# Patient Record
Sex: FEMALE | Race: WHITE | NOT HISPANIC OR LATINO | Employment: OTHER | URBAN - METROPOLITAN AREA
[De-identification: names, ages, dates, MRNs, and addresses within clinical notes are randomized per-mention and may not be internally consistent; named-entity substitution may affect disease eponyms.]

---

## 2017-11-13 ENCOUNTER — TELEPHONE (OUTPATIENT)
Dept: HEMATOLOGY/ONCOLOGY | Facility: CLINIC | Age: 72
End: 2017-11-13

## 2017-11-13 NOTE — TELEPHONE ENCOUNTER
----- Message from Joyce Shen sent at 11/13/2017  9:45 AM CST -----  Contact: Pt  Pt calling to schedule her yearly follow up with     Pt call back number [phone]

## 2017-11-13 NOTE — TELEPHONE ENCOUNTER
Returned call, spoke with patient and assisted with scheduling her f/u with labs and mammogram. Apt slips mailed out

## 2018-01-03 ENCOUNTER — HOSPITAL ENCOUNTER (OUTPATIENT)
Dept: RADIOLOGY | Facility: HOSPITAL | Age: 73
Discharge: HOME | End: 2018-01-03
Attending: INTERNAL MEDICINE
Payer: MEDICARE

## 2018-01-03 ENCOUNTER — OFFICE VISIT (OUTPATIENT)
Dept: HEMATOLOGY/ONCOLOGY | Facility: CLINIC | Age: 73
End: 2018-01-03
Payer: MEDICARE

## 2018-01-03 VITALS
RESPIRATION RATE: 20 BRPM | DIASTOLIC BLOOD PRESSURE: 75 MMHG | HEART RATE: 68 BPM | SYSTOLIC BLOOD PRESSURE: 120 MMHG | WEIGHT: 167.13 LBS | BODY MASS INDEX: 26.97 KG/M2

## 2018-01-03 VITALS — WEIGHT: 161 LBS | BODY MASS INDEX: 25.88 KG/M2 | HEIGHT: 66 IN

## 2018-01-03 DIAGNOSIS — E78.5 HYPERLIPIDEMIA, UNSPECIFIED HYPERLIPIDEMIA TYPE: ICD-10-CM

## 2018-01-03 DIAGNOSIS — Z12.31 ENCOUNTER FOR SCREENING MAMMOGRAM FOR BREAST CANCER: ICD-10-CM

## 2018-01-03 DIAGNOSIS — Z90.81 POST-SPLENECTOMY: ICD-10-CM

## 2018-01-03 DIAGNOSIS — D58.0 HEREDITARY SPHEROCYTOSIS: Primary | ICD-10-CM

## 2018-01-03 PROCEDURE — 77067 SCR MAMMO BI INCL CAD: CPT | Mod: TC

## 2018-01-03 PROCEDURE — 77067 SCR MAMMO BI INCL CAD: CPT | Mod: 26,,, | Performed by: RADIOLOGY

## 2018-01-03 PROCEDURE — 99213 OFFICE O/P EST LOW 20 MIN: CPT | Mod: PBBFAC | Performed by: INTERNAL MEDICINE

## 2018-01-03 PROCEDURE — 99999 PR PBB SHADOW E&M-EST. PATIENT-LVL III: CPT | Mod: PBBFAC,,, | Performed by: INTERNAL MEDICINE

## 2018-01-03 PROCEDURE — 99214 OFFICE O/P EST MOD 30 MIN: CPT | Mod: S$PBB,,, | Performed by: INTERNAL MEDICINE

## 2018-01-03 PROCEDURE — 77063 BREAST TOMOSYNTHESIS BI: CPT | Mod: 26,,, | Performed by: RADIOLOGY

## 2018-01-03 NOTE — PROGRESS NOTES
Mrs. Walters is a 72-year-old woman who had a splenectomy in 1982 for hereditary   spherocytosis.  At that time, she had compensated hemolysis and cholelithiasis   and she had previously had problems with anemia.  Since an open cholecystectomy   was to be performed, an elective splenectomy was carried out the same time.  A   liver biopsy was performed because of mild elevation of the transaminases and   this was normal.    She has been followed since that time without any evidence of recurrent anemia   and no aplastic crises.  She had both pneumococcal 23 and meningococcal vaccines   in December 2014 and pneumococcal 13 vaccine in December 2015.    Since I last saw her, she has generally been well.  She has some stiffness of   her knees and she has considered some therapy using platelet-rich plasma.  She   has also spoken with an orthopedic surgeon.  I have encouraged her not to have   surgery unless she is having symptoms or significant impairment in function.  She has hyperlipidemia for which she takes Crestor every other day.  She is   taking calcium plus vitamin D for osteopenia.  She rarely takes a proton pump   inhibitor for symptoms of gastroesophageal reflux.    She has had no recent infection problems.  She is very aware that she needs   immediate medical attention if she has high fever or other signs of infection.    She continues to spend much of the summer in [place] and the winter in   [place].    She tells me that some of her nephews have hereditary spherocytosis and are   being seen at [facility] in [place], [place].  She understands that   one of them may soon have a partial splenectomy.    ADDITIONAL PAST HISTORY, SYSTEM REVIEW, SOCIAL HISTORY AND FAMILY HISTORY:  Have   been reviewed and updated in the electronic record.  PHYSICAL EXAMINATION:  GENERAL APPEARANCE:  Well-developed, well-nourished woman, in no distress.  EYES:  No jaundice or pallor.  EARS:  Clear canals and  membranes.  SINUSES:  No tenderness.  MOUTH AND THROAT:  No mucosal lesions.  Good dentition.  NECK:  No masses or bruits.  No thyroid abnormalities.  LYMPH NODES:  No enlarged cervical, axillary or inguinal nodes.  BREASTS:  No abnormalities.  CHEST AND LUNGS:  Normal respiratory effort.  Clear to auscultation and   percussion.  HEART:  Regular rate and rhythm without murmur or gallop.  ABDOMEN:  Soft, without masses or tenderness.  No hepatomegaly.  PERIPHERAL VASCULATURE:  Good pulses in the feet and ankles.  EXTREMITIES:  No edema or cyanosis.  SKIN:  No suspicious lesions in the areas examined.  There is some dryness of   the skin of her back and arms.  NEUROLOGIC:  Mental status is good.  She is fully oriented.  Motor function is   Normal.    LABORATORY STUDIES:  Blood counts today include hemoglobin 13.3, WBC 7710 and   platelets 294,000.  Comprehensive metabolic profile is normal.  Cholesterol is   165 with HDL 61 and LDL 87.  The mammogram has not yet been reported.    IMPRESSION:  1.  Hereditary spherocytosis.  2.  Prior splenectomy.  3.  Hyperlipidemia, controlled.  4.  Osteopenia of the femoral neck.    RECOMMENDATIONS:  1.  Continue Crestor and calcium with vitamin D.  2.  We again talked about the importance of immediate attention for signs of   infection.  3.  We also reviewed vaccinations.  I have generally repeated pneumococcal and   an meningococcal vaccines approximately five years after splenectomies.  The   issue of whether pneumococcal 13 should be repeated every five years is not   clear at this time.  4.  Return visit in one year with CBC, CMP, lipid profile and screening   mammogram.  Mrs. Walters had a number of questions about anemia, degenerative arthritis and   splenectomy issues.  Most of her 30-minute visit today was devoted to counseling   her about these matters.      PURA/CHET  dd: 01/03/2018 11:47:33 (CST)  td: 01/04/2018 05:31:52 (CST)  Doc ID   #[id number]  Job ID #[id number]    CC:

## 2018-03-27 ENCOUNTER — PATIENT MESSAGE (OUTPATIENT)
Dept: HEMATOLOGY/ONCOLOGY | Facility: CLINIC | Age: 73
End: 2018-03-27

## 2018-11-14 DIAGNOSIS — Z00.00 GENERAL MEDICAL EXAM: Primary | ICD-10-CM

## 2018-11-14 DIAGNOSIS — Z12.31 ENCOUNTER FOR SCREENING MAMMOGRAM FOR BREAST CANCER: ICD-10-CM

## 2018-11-16 ENCOUNTER — TELEPHONE (OUTPATIENT)
Dept: HEMATOLOGY/ONCOLOGY | Facility: CLINIC | Age: 73
End: 2018-11-16

## 2018-11-16 NOTE — TELEPHONE ENCOUNTER
Message fwd to .         ----- Message from Samir Clark sent at 11/16/2018  9:14 AM CST -----  Contact: pt  Needs Advice    Reason for call: Pt would like to schedule annual appt        Communication Preference: [phone]     Additional Information:

## 2018-11-16 NOTE — TELEPHONE ENCOUNTER
Returned patients call to schedule appointments. We scheduled them for 1/7/2019. Mailed appt slips.      ----- Message from Felicity Nj sent at 11/16/2018  9:59 AM CST -----  Contact: pt      ----- Message -----  From: Samir Clark  Sent: 11/16/2018   9:14 AM  To: Isai POLLOCK Jr Staff    Needs Advice    Reason for call: Pt would like to schedule annual appt        Communication Preference: [phone]     Additional Information:

## 2019-01-07 ENCOUNTER — OFFICE VISIT (OUTPATIENT)
Dept: HEMATOLOGY/ONCOLOGY | Facility: CLINIC | Age: 74
End: 2019-01-07
Payer: MEDICARE

## 2019-01-07 ENCOUNTER — HOSPITAL ENCOUNTER (OUTPATIENT)
Dept: RADIOLOGY | Facility: HOSPITAL | Age: 74
Discharge: HOME | End: 2019-01-07
Attending: INTERNAL MEDICINE
Payer: MEDICARE

## 2019-01-07 VITALS
HEIGHT: 66 IN | HEART RATE: 68 BPM | BODY MASS INDEX: 25.79 KG/M2 | DIASTOLIC BLOOD PRESSURE: 72 MMHG | WEIGHT: 160.5 LBS | SYSTOLIC BLOOD PRESSURE: 116 MMHG

## 2019-01-07 DIAGNOSIS — Z90.81 POST-SPLENECTOMY: ICD-10-CM

## 2019-01-07 DIAGNOSIS — Z12.31 ENCOUNTER FOR SCREENING MAMMOGRAM FOR BREAST CANCER: ICD-10-CM

## 2019-01-07 DIAGNOSIS — M85.851 OSTEOPENIA OF NECKS OF BOTH FEMURS: ICD-10-CM

## 2019-01-07 DIAGNOSIS — M85.852 OSTEOPENIA OF NECKS OF BOTH FEMURS: ICD-10-CM

## 2019-01-07 DIAGNOSIS — E78.5 HYPERLIPIDEMIA: ICD-10-CM

## 2019-01-07 DIAGNOSIS — D58.0 HEREDITARY SPHEROCYTOSIS: Primary | ICD-10-CM

## 2019-01-07 PROCEDURE — 99999 PR PBB SHADOW E&M-EST. PATIENT-LVL III: ICD-10-PCS | Mod: PBBFAC,,, | Performed by: INTERNAL MEDICINE

## 2019-01-07 PROCEDURE — 77063 BREAST TOMOSYNTHESIS BI: CPT | Mod: 26,,, | Performed by: RADIOLOGY

## 2019-01-07 PROCEDURE — 77063 MAMMO DIGITAL SCREENING BILAT WITH TOMOSYNTHESIS_CAD: ICD-10-PCS | Mod: 26,,, | Performed by: RADIOLOGY

## 2019-01-07 PROCEDURE — 77067 MAMMO DIGITAL SCREENING BILAT WITH TOMOSYNTHESIS_CAD: ICD-10-PCS | Mod: 26,,, | Performed by: RADIOLOGY

## 2019-01-07 PROCEDURE — 99214 OFFICE O/P EST MOD 30 MIN: CPT | Mod: S$PBB,,, | Performed by: INTERNAL MEDICINE

## 2019-01-07 PROCEDURE — 99999 PR PBB SHADOW E&M-EST. PATIENT-LVL III: CPT | Mod: PBBFAC,,, | Performed by: INTERNAL MEDICINE

## 2019-01-07 PROCEDURE — 77067 SCR MAMMO BI INCL CAD: CPT | Mod: TC

## 2019-01-07 PROCEDURE — 77067 SCR MAMMO BI INCL CAD: CPT | Mod: 26,,, | Performed by: RADIOLOGY

## 2019-01-07 PROCEDURE — 99214 PR OFFICE/OUTPT VISIT, EST, LEVL IV, 30-39 MIN: ICD-10-PCS | Mod: S$PBB,,, | Performed by: INTERNAL MEDICINE

## 2019-01-07 PROCEDURE — 99213 OFFICE O/P EST LOW 20 MIN: CPT | Mod: PBBFAC | Performed by: INTERNAL MEDICINE

## 2019-01-07 RX ORDER — ROSUVASTATIN CALCIUM 5 MG/1
TABLET, COATED ORAL
Qty: 30 TABLET | Refills: 3
Start: 2019-01-07

## 2019-01-07 NOTE — PROGRESS NOTES
"Mrs. Walters is a 73-year-old woman who in 1982 had a splenectomy for hereditary   spherocytosis.  She had previously had problems with anemia.  At the time of the   splenectomy, she had compensated hemolysis and cholelithiasis.  This was long   before the era of laparoscopic surgery and since she was to have an open   cholecystectomy, an elective splenectomy was performed at the same time.  A   liver biopsy was also obtained because of mild elevation of the transaminases   and that biopsy was normal.    She has been followed since that time without evidence of recurrent anemia and   with no aplastic crises.  She has had a pneumococcal 13 and pneumococcal 23   vaccines along with meningococcal vaccines.  She will be due for a pneumococcal   23 vaccination and a repeat meningococcal vaccination in late 2019 or early   2020.    Since her last visit one year ago, she has felt well.  She has had no   infections.  She takes 5 mg of Crestor every other day for hyperlipidemia and   she takes calcium plus vitamin D for osteopenia.  She occasionally takes a   proton pump inhibitor for symptoms of gastroesophageal reflux.  She has   degenerative arthritis in her knees, but has very little pain in the knees.  She   has recently had "stem cell therapy" into her knee joints.  She seems troubled   by a "clicking sensation" of her knees.    She is very aware that she should always report her splenectomy to anyone who   sees her for fever or possible infection.    As I have noted in the past, she has nephews who have hereditary spherocytosis.    She continues to spend much of the summer in [place] and the winter in   [place].    ADDITIONAL PAST HISTORY, SYSTEM REVIEW, SOCIAL HISTORY AND FAMILY HISTORY:  Have   been reviewed and updated in the electronic record.    PHYSICAL EXAMINATION:  GENERAL APPEARANCE:  Well-developed, well-nourished woman, in no distress.  EYES:  No jaundice or pallor.  EARS:  Clear canals and " membranes.  NOSE:  Clear nares.  SINUSES:  No tenderness.  MOUTH AND THROAT:  Good dentition.  No mucosal lesions.  NECK:  No thyroid abnormalities.  No masses or bruits.  LYMPH NODES:  No enlarged cervical, axillary or inguinal nodes.  CHEST AND LUNGS:  Normal respiratory effort.  Clear to auscultation and   percussion.  BREASTS:  No abnormalities.  HEART:  Regular rate and rhythm without murmur or gallop.  ABDOMEN:  Soft without masses or tenderness.  No hepatomegaly.  EXTREMITIES:  No edema or cyanosis.  PERIPHERAL VASCULATURE:  Adequate pulses in the feet and ankles.  SKIN:  No suspicious lesions in the areas examined.  NEUROLOGIC:  Motor function is normal.  Mental status is good.  She is fully   oriented.    LABORATORY STUDIES:  Blood counts today include hemoglobin 14.2, WBC 7750 and   platelets 329,000.  Comprehensive metabolic profile is entirely normal.  A lipid   profile includes cholesterol 151, HDL 58 and LDL 82.  Triglyceride is 54.  The   mammogram report is pending.    IMPRESSION:  1.  Hereditary spherocytosis.  2.  Prior splenectomy.  3.  Osteopenia of the femoral neck.  4.  Hyperlipidemia, controlled.    RECOMMENDATIONS:  1.  Continue Crestor and calcium with vitamin D.  2.  I wrote down the vaccinations that she should receive in about a year.  3.  She understands that I will be retiring soon. If she may wishes to return to   [facility], I have given her the names of several of my colleagues whom she could   see if she wants to continue visits with a hematologist.  I think that she could   adequately be followed by her internist or any physician who is aware of   potential problems associated with prior splenectomy.    Mrs. Walters had a number of questions about her medical status and most of her   30-minute visit today was devoted to counseling her about those.      PURA/HN  dd: 01/07/2019 13:56:46 (CST)  td: 01/08/2019 02:54:36 (CST)  Doc ID   #[id number]  Job ID #[id number]    CC:

## 2019-03-26 ENCOUNTER — TELEPHONE (OUTPATIENT)
Dept: HEMATOLOGY/ONCOLOGY | Facility: CLINIC | Age: 74
End: 2019-03-26

## 2019-03-26 NOTE — TELEPHONE ENCOUNTER
----- Message from Elvia Andrews sent at 3/26/2019  8:54 AM CDT -----  Contact: pt   Pt called to speak with nurse alan have some questions   Callback#[phone] or [phone]  Thank You  ASAD Andrews       Patient will call to make appointment for Dr Lakhani at the end of the year.

## 2019-10-16 ENCOUNTER — TELEPHONE (OUTPATIENT)
Dept: HEMATOLOGY/ONCOLOGY | Facility: CLINIC | Age: 74
End: 2019-10-16

## 2019-10-16 NOTE — TELEPHONE ENCOUNTER
"    Attempted to return call.  Voicemail left      ----- Message from Alexandra Oliver sent at 10/16/2019  1:38 PM CDT -----  Contact: Olamide   Scheduling Request    Patient Status:  New   Scheduling Appt : annual checkup   Time/Date Preference: mid-day   MyChart Active User?: No   Relationship to Patient?: self   Contact Preference?: [phone] (please call this number between 3-5p.m)  Treating Provider:  Pt of Dr. Alex asking to start seeing Dr. Lakhani   Do you feel you need to be seen today? No     Additional Notes:  - pt also asking to be scheduled for a bone density test as well. Please call and advise.   "Thank you for all that you do for our patients'"        "

## 2019-10-17 ENCOUNTER — TELEPHONE (OUTPATIENT)
Dept: HEMATOLOGY/ONCOLOGY | Facility: CLINIC | Age: 74
End: 2019-10-17

## 2019-10-17 NOTE — TELEPHONE ENCOUNTER
"  See previous message  ----- Message from Alexandra Oliver sent at 10/17/2019  8:42 AM CDT -----  Contact: YONI Walters  Scheduling Request     Patient Status:  New   Scheduling Appt : annual checkup   Time/Date Preference: mid-day   MyChart Active User?: No   Relationship to Patient?: self   Contact Preference?: [phone]   Treating Provider:  Pt of Dr. Alex asking to start seeing Dr. Lakhani   Do you feel you need to be seen today? No      Additional Notes:  - pt also asking to be scheduled for a bone density test as well. Please call and advise.   "Thank you for all that you do for our patients'"        "

## 2019-10-17 NOTE — TELEPHONE ENCOUNTER
Spoke to patient.  Informed her Dr Lakhani's schedule is not open for January.  Will call back in 2 weeks to schedule      ----- Message from Vargas Lee sent at 10/16/2019  5:01 PM CDT -----  Contact: Pt  Pt would like to be called back regarding annual visit needs to be after 1/3/2019 nothing was available. Pt's requested.     Pt can be reached at [phone].    Thank You.

## 2019-11-04 ENCOUNTER — TELEPHONE (OUTPATIENT)
Dept: HEMATOLOGY/ONCOLOGY | Facility: CLINIC | Age: 74
End: 2019-11-04

## 2019-11-04 DIAGNOSIS — D64.9 ANEMIA, UNSPECIFIED TYPE: Primary | ICD-10-CM

## 2019-11-04 NOTE — TELEPHONE ENCOUNTER
Received message stating that pt would like to schedule appt with Dr. Lakhani, pt is a previous pt of Dr. Alex. Called pt no answer. Scheduled pt for 12/12 with Dr. Lakhani, will mail appt slip.

## 2019-11-04 NOTE — TELEPHONE ENCOUNTER
----- Message from Vero Elder sent at 11/4/2019  8:53 AM CST -----  Contact: Pt  Schedule an appt w/ Dr. Lakhani    Contact: [phone]

## 2019-11-04 NOTE — TELEPHONE ENCOUNTER
Spoke to patient.  Informed her I will cancel appt in December and call back once schedule is open for January.  vebalized understanding      ----- Message from Jessica Salter sent at 11/4/2019  2:33 PM CST -----  Contact: pt  Pt states that her appt needs to be after January 5th for insurance to pay    # [phone]

## 2019-11-25 ENCOUNTER — TELEPHONE (OUTPATIENT)
Dept: HEMATOLOGY/ONCOLOGY | Facility: CLINIC | Age: 74
End: 2019-11-25

## 2019-11-25 NOTE — TELEPHONE ENCOUNTER
Spoke to patient.  Informed her Dr Lakhani's schedule is not open to schedule appts and will contact her once open to schedule.  Verbalized understanding      ----- Message from Oliver Gaitan sent at 11/25/2019 11:24 AM CST -----  Contact: Patient  Patient would like a call from the office to schedule an appointment. There are no upcoming dates for January. The patient can be reached at     [phone]

## 2019-11-25 NOTE — TELEPHONE ENCOUNTER
See previous message      ----- Message from Sandy Terrell sent at 11/25/2019  1:46 PM CST -----  Contact: Pt  Pt called to speak to the nurse to schedule her annual appt and would like a call back today at [phone] [place] or  [phone] cell    
Negative

## 2019-12-04 ENCOUNTER — TELEPHONE (OUTPATIENT)
Dept: HEMATOLOGY/ONCOLOGY | Facility: CLINIC | Age: 74
End: 2019-12-04

## 2019-12-04 NOTE — TELEPHONE ENCOUNTER
----- Message from Nessa Pedersen RN sent at 12/4/2019  3:15 PM CST -----  Contact: pt      ----- Message -----  From: Dahlia HARRISON August  Sent: 12/4/2019   8:55 AM CST  To: Kar Barron Staff    Reason:Pt returning call to schedule annual appt    Communication: [phone]

## 2019-12-04 NOTE — TELEPHONE ENCOUNTER
Called pt about scheduling annual appt with Dr. Lakhani. Pt is a former pt of Dr. Alex. Mailed out appt letter.

## 2019-12-31 ENCOUNTER — TELEPHONE (OUTPATIENT)
Dept: HEMATOLOGY/ONCOLOGY | Facility: CLINIC | Age: 74
End: 2019-12-31

## 2019-12-31 NOTE — TELEPHONE ENCOUNTER
Spoke to patient. Informed her Dr Lakhani would like her to contact her primary care or ob/gyn to order mammogram.  Instructed patient to have primary care physician fax orders to the [facility] to schedule her mammogram.  Will call back if any problems    ----- Message from Lu Quigley MA sent at 12/31/2019 10:16 AM CST -----  Contact: self/[phone]  PT is requesting to have orders for her mammogram  To be scheduled on the same day of when she comes in to see Dr. Lakhani. Pt states her mammogram is normal ordered and done when she comes to her appt 1/14  Requesting call back.

## 2020-01-02 ENCOUNTER — TELEPHONE (OUTPATIENT)
Dept: HEMATOLOGY/ONCOLOGY | Facility: CLINIC | Age: 75
End: 2020-01-02

## 2020-01-02 DIAGNOSIS — M85.852 OSTEOPENIA OF NECKS OF BOTH FEMURS: Primary | ICD-10-CM

## 2020-01-02 DIAGNOSIS — M85.851 OSTEOPENIA OF NECKS OF BOTH FEMURS: Primary | ICD-10-CM

## 2020-01-02 DIAGNOSIS — E55.9 VITAMIN D DEFICIENCY: ICD-10-CM

## 2020-01-02 NOTE — TELEPHONE ENCOUNTER
Spoke to patient.  Requesting immunizations to be scheduled.  Informed her will contact her if able to schedule    ----- Message from Mohsen Ernst sent at 1/2/2020  9:45 AM CST -----  Contact: SELF  Pt states ask for a call in regards to 2 injections that she need to have this year Pt ask for a call    Contact info  [phone] or

## 2020-01-03 ENCOUNTER — TELEPHONE (OUTPATIENT)
Dept: HEMATOLOGY/ONCOLOGY | Facility: CLINIC | Age: 75
End: 2020-01-03

## 2020-01-03 DIAGNOSIS — Z12.31 ENCOUNTER FOR SCREENING MAMMOGRAM FOR MALIGNANT NEOPLASM OF BREAST: Primary | ICD-10-CM

## 2020-01-03 NOTE — TELEPHONE ENCOUNTER
"----- Message from Alexandra Oliver sent at 1/3/2020  1:02 PM CST -----  Contact: Olamide  Scheduling Request    Patient Status: established   Scheduling Appt : mammo screening  Time/Date Preference: Jan 14th in consideration of the other appts   MyChart Active User?: No   Relationship to Patient?: self   Contact Preference?: [phone]   Treating Provider: Kar Barron MD  Do you feel you need to be seen today? No     Additional Notes:  "Thank you for all that you do for our patients'"      "

## 2020-01-10 ENCOUNTER — PATIENT MESSAGE (OUTPATIENT)
Dept: HEMATOLOGY/ONCOLOGY | Facility: CLINIC | Age: 75
End: 2020-01-10

## 2020-01-14 ENCOUNTER — HOSPITAL ENCOUNTER (OUTPATIENT)
Dept: RADIOLOGY | Facility: HOSPITAL | Age: 75
Discharge: HOME | End: 2020-01-14
Attending: NURSE PRACTITIONER
Payer: MEDICARE

## 2020-01-14 ENCOUNTER — OFFICE VISIT (OUTPATIENT)
Dept: HEMATOLOGY/ONCOLOGY | Facility: CLINIC | Age: 75
End: 2020-01-14
Payer: MEDICARE

## 2020-01-14 ENCOUNTER — CLINICAL SUPPORT (OUTPATIENT)
Dept: INFECTIOUS DISEASES | Facility: CLINIC | Age: 75
End: 2020-01-14
Payer: MEDICARE

## 2020-01-14 VITALS
BODY MASS INDEX: 25.44 KG/M2 | HEART RATE: 70 BPM | TEMPERATURE: 98 F | WEIGHT: 158.31 LBS | HEIGHT: 66 IN | SYSTOLIC BLOOD PRESSURE: 142 MMHG | DIASTOLIC BLOOD PRESSURE: 66 MMHG | RESPIRATION RATE: 16 BRPM | OXYGEN SATURATION: 98 %

## 2020-01-14 VITALS — BODY MASS INDEX: 25.82 KG/M2 | WEIGHT: 160 LBS

## 2020-01-14 DIAGNOSIS — D58.0 HEREDITARY SPHEROCYTOSIS: Primary | ICD-10-CM

## 2020-01-14 DIAGNOSIS — Z12.31 ENCOUNTER FOR SCREENING MAMMOGRAM FOR MALIGNANT NEOPLASM OF BREAST: ICD-10-CM

## 2020-01-14 DIAGNOSIS — Z03.89 ENCOUNTER FOR OBSERVATION FOR OTHER SUSPECTED DISEASES AND CONDITIONS RULED OUT: ICD-10-CM

## 2020-01-14 PROCEDURE — 1126F AMNT PAIN NOTED NONE PRSNT: CPT | Mod: ,,, | Performed by: INTERNAL MEDICINE

## 2020-01-14 PROCEDURE — 1126F PR PAIN SEVERITY QUANTIFIED, NO PAIN PRESENT: ICD-10-PCS | Mod: ,,, | Performed by: INTERNAL MEDICINE

## 2020-01-14 PROCEDURE — 77063 MAMMO DIGITAL SCREENING BILAT WITH TOMOSYNTHESIS_CAD: ICD-10-PCS | Mod: 26,,, | Performed by: RADIOLOGY

## 2020-01-14 PROCEDURE — 99215 PR OFFICE/OUTPT VISIT, EST, LEVL V, 40-54 MIN: ICD-10-PCS | Mod: S$PBB,,, | Performed by: INTERNAL MEDICINE

## 2020-01-14 PROCEDURE — 77067 SCR MAMMO BI INCL CAD: CPT | Mod: 26,,, | Performed by: RADIOLOGY

## 2020-01-14 PROCEDURE — 1159F PR MEDICATION LIST DOCUMENTED IN MEDICAL RECORD: ICD-10-PCS | Mod: ,,, | Performed by: INTERNAL MEDICINE

## 2020-01-14 PROCEDURE — 77063 BREAST TOMOSYNTHESIS BI: CPT | Mod: 26,,, | Performed by: RADIOLOGY

## 2020-01-14 PROCEDURE — 99999 PR PBB SHADOW E&M-EST. PATIENT-LVL III: ICD-10-PCS | Mod: PBBFAC,,, | Performed by: INTERNAL MEDICINE

## 2020-01-14 PROCEDURE — 77067 SCR MAMMO BI INCL CAD: CPT | Mod: TC

## 2020-01-14 PROCEDURE — 1159F MED LIST DOCD IN RCRD: CPT | Mod: ,,, | Performed by: INTERNAL MEDICINE

## 2020-01-14 PROCEDURE — 99213 OFFICE O/P EST LOW 20 MIN: CPT | Mod: PBBFAC,25 | Performed by: INTERNAL MEDICINE

## 2020-01-14 PROCEDURE — G0009 ADMIN PNEUMOCOCCAL VACCINE: HCPCS | Mod: PBBFAC

## 2020-01-14 PROCEDURE — 99215 OFFICE O/P EST HI 40 MIN: CPT | Mod: S$PBB,,, | Performed by: INTERNAL MEDICINE

## 2020-01-14 PROCEDURE — 99999 PR PBB SHADOW E&M-EST. PATIENT-LVL III: CPT | Mod: PBBFAC,,, | Performed by: INTERNAL MEDICINE

## 2020-01-14 PROCEDURE — 90734 MENACWYD/MENACWYCRM VACC IM: CPT | Mod: PBBFAC

## 2020-01-14 PROCEDURE — 77067 MAMMO DIGITAL SCREENING BILAT WITH TOMOSYNTHESIS_CAD: ICD-10-PCS | Mod: 26,,, | Performed by: RADIOLOGY

## 2020-01-14 NOTE — PROGRESS NOTES
Subjective:       Patient ID: Olamide Walters is a 74 y.o. female.    Chief Complaint: Follow-up    Referring Physician: Jere Alex MD    Mrs. Walters is a 73-year-old woman who in 1982 had a splenectomy for hereditary spherocytosis.  She had previously had problems with anemia.  At the time of the splenectomy, she had compensated hemolysis and cholelithiasis.  This was long before the era of laparoscopic surgery and since she was to have an open cholecystectomy, an elective splenectomy was performed at the same time.  A liver biopsy was also obtained because of mild elevation of the transaminases and that biopsy was normal.    She has been followed since that time without evidence of recurrent anemia and with no aplastic crises.  She has had a pneumococcal 13 and pneumococcal 23 vaccines along with meningococcal vaccines.  She will be due for a pneumococcal   23 vaccination and a repeat meningococcal vaccination in late 2019 or early 2020.    Her last visit was 1 year ago with Dr. Alex.      HPI  Review of Systems   Constitutional: Negative.    HENT: Negative.    Eyes: Negative.    Respiratory: Negative.    Cardiovascular: Negative.    Gastrointestinal: Negative.    Endocrine: Negative.    Genitourinary: Negative.    Musculoskeletal: Negative.    Skin: Negative.    Allergic/Immunologic: Negative for environmental allergies, food allergies and immunocompromised state.   Neurological: Negative.    Hematological: Negative for adenopathy. Does not bruise/bleed easily.   Psychiatric/Behavioral: Negative.        Objective:      Physical Exam   Constitutional: She is oriented to person, place, and time. She appears well-developed and well-nourished.   HENT:   Head: Normocephalic and atraumatic.   Eyes: Conjunctivae are normal. No scleral icterus.   Neck: Normal range of motion. Neck supple.   Cardiovascular: Normal rate and intact distal pulses.   Pulmonary/Chest: Effort normal. No respiratory distress.   Abdominal:  Soft. She exhibits no distension. There is no tenderness.   Musculoskeletal: Normal range of motion. She exhibits no edema.   Neurological: She is alert and oriented to person, place, and time. No cranial nerve deficit.   Skin: Skin is warm and dry.   Psychiatric: She has a normal mood and affect. Her behavior is normal.   Nursing note and vitals reviewed.      Assessment:       1. Hereditary spherocytosis        Plan:       Splenectomy is a cure for hereditary spherocytosis.    Return visit in 1 year with CBC and mammogram

## 2020-10-26 ENCOUNTER — TELEPHONE (OUTPATIENT)
Dept: HEMATOLOGY/ONCOLOGY | Facility: CLINIC | Age: 75
End: 2020-10-26

## 2020-10-26 NOTE — TELEPHONE ENCOUNTER
----- Message from Christi Hendrickson sent at 10/26/2020  9:17 AM CDT -----  Contact: Patient  Patient Advice/Staff Message     Caller name/title: Pt    Provider: Kar    Reason for call: Pt calling to speak with the RN regarding needing a referral to cardiology     Do you feel you need to be seen today:: No        Communication Preference: [phone]    Additional Information:

## 2020-12-11 ENCOUNTER — TELEPHONE (OUTPATIENT)
Dept: HEMATOLOGY/ONCOLOGY | Facility: CLINIC | Age: 75
End: 2020-12-11

## 2020-12-11 NOTE — TELEPHONE ENCOUNTER
"----- Message from Rubin Pettit sent at 12/11/2020  7:57 AM CST -----  Scheduling Request    Patient Status: Establish   Scheduling Appt :Olamide   Time/Date Preference: 10 AM or later  [employer] Active User?: No  Relationship to Patient?: Self   Contact Preference?: [phone]  Treating Provider:  Dr Lakhani   Do you feel you need to be seen today? No     Additional Notes:  Mammo, Labs, & Establish after 1/14/20  "Thank you for all that you do for our patients'"           "

## 2021-01-12 ENCOUNTER — TELEPHONE (OUTPATIENT)
Dept: HEMATOLOGY/ONCOLOGY | Facility: CLINIC | Age: 76
End: 2021-01-12

## 2021-02-22 ENCOUNTER — TELEPHONE (OUTPATIENT)
Dept: HEMATOLOGY/ONCOLOGY | Facility: CLINIC | Age: 76
End: 2021-02-22

## 2021-03-02 DIAGNOSIS — Z12.31 ENCOUNTER FOR SCREENING MAMMOGRAM FOR MALIGNANT NEOPLASM OF BREAST: Primary | ICD-10-CM

## 2021-03-22 ENCOUNTER — HOSPITAL ENCOUNTER (OUTPATIENT)
Dept: RADIOLOGY | Facility: HOSPITAL | Age: 76
Discharge: HOME | End: 2021-03-22
Attending: INTERNAL MEDICINE
Payer: MEDICARE

## 2021-03-22 ENCOUNTER — OFFICE VISIT (OUTPATIENT)
Dept: HEMATOLOGY/ONCOLOGY | Facility: CLINIC | Age: 76
End: 2021-03-22
Payer: MEDICARE

## 2021-03-22 VITALS — BODY MASS INDEX: 25.5 KG/M2 | WEIGHT: 158 LBS

## 2021-03-22 VITALS
SYSTOLIC BLOOD PRESSURE: 147 MMHG | WEIGHT: 155.63 LBS | OXYGEN SATURATION: 100 % | BODY MASS INDEX: 25.01 KG/M2 | TEMPERATURE: 98 F | RESPIRATION RATE: 12 BRPM | HEART RATE: 83 BPM | DIASTOLIC BLOOD PRESSURE: 69 MMHG | HEIGHT: 66 IN

## 2021-03-22 DIAGNOSIS — D58.0 HEREDITARY SPHEROCYTOSIS: ICD-10-CM

## 2021-03-22 DIAGNOSIS — Z12.31 ENCOUNTER FOR SCREENING MAMMOGRAM FOR MALIGNANT NEOPLASM OF BREAST: Primary | ICD-10-CM

## 2021-03-22 DIAGNOSIS — Z12.31 ENCOUNTER FOR SCREENING MAMMOGRAM FOR MALIGNANT NEOPLASM OF BREAST: ICD-10-CM

## 2021-03-22 DIAGNOSIS — Z03.89 ENCOUNTER FOR OBSERVATION FOR OTHER SUSPECTED DISEASES AND CONDITIONS RULED OUT: ICD-10-CM

## 2021-03-22 PROCEDURE — 77063 MAMMO DIGITAL SCREENING BILAT WITH TOMO: ICD-10-PCS | Mod: 26,,, | Performed by: RADIOLOGY

## 2021-03-22 PROCEDURE — 99214 OFFICE O/P EST MOD 30 MIN: CPT | Mod: PBBFAC | Performed by: INTERNAL MEDICINE

## 2021-03-22 PROCEDURE — 77067 MAMMO DIGITAL SCREENING BILAT WITH TOMO: ICD-10-PCS | Mod: 26,,, | Performed by: RADIOLOGY

## 2021-03-22 PROCEDURE — 99999 PR PBB SHADOW E&M-EST. PATIENT-LVL IV: CPT | Mod: PBBFAC,,, | Performed by: INTERNAL MEDICINE

## 2021-03-22 PROCEDURE — 77067 SCR MAMMO BI INCL CAD: CPT | Mod: 26,,, | Performed by: RADIOLOGY

## 2021-03-22 PROCEDURE — 77067 SCR MAMMO BI INCL CAD: CPT | Mod: TC

## 2021-03-22 PROCEDURE — 99214 OFFICE O/P EST MOD 30 MIN: CPT | Mod: S$PBB,,, | Performed by: INTERNAL MEDICINE

## 2021-03-22 PROCEDURE — 99999 PR PBB SHADOW E&M-EST. PATIENT-LVL IV: ICD-10-PCS | Mod: PBBFAC,,, | Performed by: INTERNAL MEDICINE

## 2021-03-22 PROCEDURE — 77063 BREAST TOMOSYNTHESIS BI: CPT | Mod: 26,,, | Performed by: RADIOLOGY

## 2021-03-22 PROCEDURE — 99214 PR OFFICE/OUTPT VISIT, EST, LEVL IV, 30-39 MIN: ICD-10-PCS | Mod: S$PBB,,, | Performed by: INTERNAL MEDICINE

## 2021-03-22 RX ORDER — PANTOPRAZOLE SODIUM 40 MG/1
40 TABLET, DELAYED RELEASE ORAL
COMMUNITY
Start: 2020-05-11

## 2021-03-22 RX ORDER — DESOXIMETASONE 2.5 MG/G
CREAM TOPICAL
COMMUNITY
Start: 2021-01-08

## 2021-03-22 RX ORDER — MONTELUKAST SODIUM 10 MG/1
10 TABLET ORAL DAILY
COMMUNITY
Start: 2021-03-14

## 2022-01-03 ENCOUNTER — TELEPHONE (OUTPATIENT)
Dept: OBSTETRICS AND GYNECOLOGY | Facility: CLINIC | Age: 77
End: 2022-01-03
Payer: MEDICARE

## 2022-01-03 NOTE — TELEPHONE ENCOUNTER
----- Message from Priyanka Springer sent at 1/3/2022  8:45 AM CST -----  Contact: pt  Type: Needs Medical Advice    Who Called: pt  Best Call Back Number: [phone]    Inquiry/Question: pt calling to see when the date of her last yearly visit was so that she can schedule, please advise pt Thank you~

## 2022-01-12 ENCOUNTER — TELEPHONE (OUTPATIENT)
Dept: HEMATOLOGY/ONCOLOGY | Facility: CLINIC | Age: 77
End: 2022-01-12
Payer: MEDICARE

## 2022-02-11 ENCOUNTER — OFFICE VISIT (OUTPATIENT)
Dept: OBSTETRICS AND GYNECOLOGY | Facility: CLINIC | Age: 77
End: 2022-02-11
Payer: MEDICARE

## 2022-02-11 VITALS — BODY MASS INDEX: 23.89 KG/M2 | SYSTOLIC BLOOD PRESSURE: 122 MMHG | DIASTOLIC BLOOD PRESSURE: 66 MMHG | WEIGHT: 148 LBS

## 2022-02-11 DIAGNOSIS — Z12.4 SCREENING FOR MALIGNANT NEOPLASM OF THE CERVIX: ICD-10-CM

## 2022-02-11 DIAGNOSIS — Z01.419 ENCOUNTER FOR ANNUAL ROUTINE GYNECOLOGICAL EXAMINATION: Primary | ICD-10-CM

## 2022-02-11 PROCEDURE — G0101 PR CA SCREEN;PELVIC/BREAST EXAM: ICD-10-PCS | Mod: S$GLB,,, | Performed by: OBSTETRICS & GYNECOLOGY

## 2022-02-11 PROCEDURE — G0101 CA SCREEN;PELVIC/BREAST EXAM: HCPCS | Mod: S$GLB,,, | Performed by: OBSTETRICS & GYNECOLOGY

## 2022-02-11 PROCEDURE — 88175 CYTOPATH C/V AUTO FLUID REDO: CPT | Performed by: OBSTETRICS & GYNECOLOGY

## 2022-02-11 NOTE — PROGRESS NOTES
Medicare Breast and Pelvic    HPI:  Olamide Walters is a 76 y.o. female  presents for a well woman exam.  LMP: No LMP recorded. Patient is postmenopausal..  No issues, problems, or complaints.    Past Medical History:   Diagnosis Date    Arthritis     Hyperlipidemia     Osteopenia     Unspecified vitamin D deficiency      Past Surgical History:   Procedure Laterality Date    APPENDECTOMY      CHOLECYSTECTOMY      SPLENECTOMY, TOTAL  1982     Social History     Socioeconomic History    Marital status:    Tobacco Use    Smoking status: Never Smoker    Smokeless tobacco: Never Used   Substance and Sexual Activity    Alcohol use: Yes     Alcohol/week: 2.0 standard drinks     Types: 2 Glasses of wine per week    Drug use: No    Sexual activity: Not Currently     Family History   Problem Relation Age of Onset    Breast cancer Maternal Cousin     Colon cancer Neg Hx     Ovarian cancer Neg Hx     Uterine cancer Neg Hx      OB History        0    Para   0    Term   0       0    AB   0    Living   0       SAB   0    IAB   0    Ectopic   0    Multiple   0    Live Births   0                 /66 (BP Location: Right arm, Patient Position: Sitting)   Wt 67.1 kg (148 lb)   BMI 23.89 kg/m²     ROS:  GENERAL: Denies weight gain or weight loss. Feeling well overall.   SKIN: Denies rash or lesions.   HEAD: Denies head injury or headache.   NODES: Denies enlarged lymph nodes.   CHEST: Denies chest pain or shortness of breath.   CARDIOVASCULAR: Denies palpitations or left sided chest pain.   ABDOMEN: No abdominal pain, constipation, diarrhea, nausea, vomiting or rectal bleeding.   URINARY: No frequency, dysuria, hematuria, or burning on urination.  REPRODUCTIVE: See HPI.   BREASTS: The patient performs breast self-examination and denies pain, lumps, or nipple discharge.   HEMATOLOGIC: No easy bruisability or excessive bleeding.   MUSCULOSKELETAL: Denies joint pain or swelling.    NEUROLOGIC: Denies syncope or weakness.   PSYCHIATRIC: Denies depression, anxiety or mood swings.    PHYSICAL EXAM:    APPEARANCE: Well nourished, well developed, in no acute distress.  AFFECT: WNL, alert and oriented x 3  SKIN: No acne or hirsutism  NECK: Neck symmetric without masses or thyromegaly  NODES: No inguinal, cervical, axillary, or femoral lymph node enlargement  CHEST: Good respiratory effect  ABDOMEN: Soft.  No tenderness or masses.  No hepatosplenomegaly.  No hernias.  BREASTS: Symmetrical, no skin changes or visible lesions.  No palpable masses, nipple discharge bilaterally.  PELVIC: Normal external genitalia without lesions.  Normal hair distribution.  Adequate perineal body, normal urethral meatus.  Vagina moist and well rugated without lesions or discharge.  Cervix pink, without lesions, discharge or tenderness.  No significant cystocele or rectocele.  Bimanual exam shows uterus to be normal size, regular, mobile and nontender.  Adnexa without masses or tenderness.    RECTAL: Rectovaginal exam confirms above with normal sphincter tone, no masses.  EXTREMITIES: No edema.      ICD-10-CM ICD-9-CM    1. Encounter for annual routine gynecological examination  Z01.419 V72.31    2. Screening for malignant neoplasm of the cervix  Z12.4 V76.2 Liquid-Based Pap Smear, Screening      Liquid-Based Pap Smear, Screening       Pap done  Mammogram done  Cologuard per PCP  Return 1 year or as needed

## 2022-02-17 LAB
FINAL PATHOLOGIC DIAGNOSIS: NORMAL
Lab: NORMAL

## 2022-03-21 NOTE — PROGRESS NOTES
Route Chart for Scheduling    BMT Chart Routing      Follow up with physician 1 year.   Follow up with [facility]    Labs CBC   Lab interval:     Imaging Other   Mammogram   Pharmacy appointment    Other referrals               Subjective:       Patient ID: Olamide Walters is a 76 y.o. female.    Chief Complaint: No chief complaint on file.    Referring Physician: Jere Alex MD    Mrs. Walters is a 73-year-old woman who in 1982 had a splenectomy for hereditary spherocytosis.  She had previously had problems with anemia.  At the time of the splenectomy, she had compensated hemolysis and cholelithiasis.  This was long before the era of laparoscopic surgery and since she was to have an open cholecystectomy, an elective splenectomy was performed at the same time.  A liver biopsy was also obtained because of mild elevation of the transaminases and that biopsy was normal.    She has been followed since that time without evidence of recurrent anemia and with no aplastic crises.  She has had a pneumococcal 13 and pneumococcal 23 vaccines along with meningococcal vaccines.  She will be due for a pneumococcal   23 vaccination and a repeat meningococcal vaccination in late 2019 or early 2020.    Her last visit was 1 year ago with Dr. Aelx.    Return visit 3/23/2022  No acute interval events  CBC remains normal  Mammogram results pending    HPI  Review of Systems   Constitutional: Negative.    HENT: Negative.    Eyes: Negative.    Respiratory: Negative.    Cardiovascular: Negative.    Gastrointestinal: Negative.    Endocrine: Negative.    Genitourinary: Negative.    Musculoskeletal: Negative.    Skin: Negative.    Allergic/Immunologic: Negative for environmental allergies, food allergies and immunocompromised state.   Neurological: Negative.    Hematological: Negative for adenopathy. Does not bruise/bleed easily.   Psychiatric/Behavioral: Negative.        Objective:      Physical Exam  Vitals and nursing note reviewed.    Constitutional:       Appearance: She is well-developed.   HENT:      Head: Normocephalic and atraumatic.   Eyes:      General: No scleral icterus.     Conjunctiva/sclera: Conjunctivae normal.   Cardiovascular:      Rate and Rhythm: Normal rate.   Pulmonary:      Effort: Pulmonary effort is normal. No respiratory distress.   Abdominal:      General: There is no distension.      Palpations: Abdomen is soft.      Tenderness: There is no abdominal tenderness.   Musculoskeletal:         General: Normal range of motion.      Cervical back: Normal range of motion and neck supple.   Skin:     General: Skin is warm and dry.   Neurological:      Mental Status: She is alert and oriented to person, place, and time.      Cranial Nerves: No cranial nerve deficit.   Psychiatric:         Behavior: Behavior normal.         Assessment:       No diagnosis found.    Plan:       Splenectomy is a cure for hereditary spherocytosis. CBC remains normal.    Return visit in 1 year with CBC and mammogram

## 2022-03-23 ENCOUNTER — HOSPITAL ENCOUNTER (OUTPATIENT)
Dept: RADIOLOGY | Facility: HOSPITAL | Age: 77
Discharge: HOME | End: 2022-03-23
Attending: INTERNAL MEDICINE
Payer: MEDICARE

## 2022-03-23 ENCOUNTER — OFFICE VISIT (OUTPATIENT)
Dept: HEMATOLOGY/ONCOLOGY | Facility: CLINIC | Age: 77
End: 2022-03-23
Payer: MEDICARE

## 2022-03-23 VITALS
SYSTOLIC BLOOD PRESSURE: 121 MMHG | HEIGHT: 66 IN | TEMPERATURE: 98 F | DIASTOLIC BLOOD PRESSURE: 62 MMHG | HEART RATE: 80 BPM | OXYGEN SATURATION: 99 % | BODY MASS INDEX: 24.64 KG/M2 | WEIGHT: 153.31 LBS

## 2022-03-23 DIAGNOSIS — D58.0 HEREDITARY SPHEROCYTOSIS: Primary | ICD-10-CM

## 2022-03-23 DIAGNOSIS — Z12.31 BREAST CANCER SCREENING BY MAMMOGRAM: ICD-10-CM

## 2022-03-23 DIAGNOSIS — Z12.31 ENCOUNTER FOR SCREENING MAMMOGRAM FOR MALIGNANT NEOPLASM OF BREAST: ICD-10-CM

## 2022-03-23 PROCEDURE — 99213 OFFICE O/P EST LOW 20 MIN: CPT | Mod: PBBFAC | Performed by: INTERNAL MEDICINE

## 2022-03-23 PROCEDURE — 99999 PR PBB SHADOW E&M-EST. PATIENT-LVL III: ICD-10-PCS | Mod: PBBFAC,,, | Performed by: INTERNAL MEDICINE

## 2022-03-23 PROCEDURE — 77067 SCR MAMMO BI INCL CAD: CPT | Mod: 26,,, | Performed by: RADIOLOGY

## 2022-03-23 PROCEDURE — 99215 OFFICE O/P EST HI 40 MIN: CPT | Mod: S$PBB,,, | Performed by: INTERNAL MEDICINE

## 2022-03-23 PROCEDURE — 77067 MAMMO DIGITAL SCREENING BILAT WITH TOMO: ICD-10-PCS | Mod: 26,,, | Performed by: RADIOLOGY

## 2022-03-23 PROCEDURE — 77063 MAMMO DIGITAL SCREENING BILAT WITH TOMO: ICD-10-PCS | Mod: 26,,, | Performed by: RADIOLOGY

## 2022-03-23 PROCEDURE — 99999 PR PBB SHADOW E&M-EST. PATIENT-LVL III: CPT | Mod: PBBFAC,,, | Performed by: INTERNAL MEDICINE

## 2022-03-23 PROCEDURE — 99215 PR OFFICE/OUTPT VISIT, EST, LEVL V, 40-54 MIN: ICD-10-PCS | Mod: S$PBB,,, | Performed by: INTERNAL MEDICINE

## 2022-03-23 PROCEDURE — 77067 SCR MAMMO BI INCL CAD: CPT | Mod: TC

## 2022-03-23 PROCEDURE — 77063 BREAST TOMOSYNTHESIS BI: CPT | Mod: TC

## 2022-03-23 PROCEDURE — 77063 BREAST TOMOSYNTHESIS BI: CPT | Mod: 26,,, | Performed by: RADIOLOGY

## 2022-03-25 ENCOUNTER — TELEPHONE (OUTPATIENT)
Dept: HEMATOLOGY/ONCOLOGY | Facility: CLINIC | Age: 77
End: 2022-03-25
Payer: MEDICARE

## 2022-03-25 NOTE — TELEPHONE ENCOUNTER
How Severe Is Your Rash?: moderate Pt states she did not receive results on portal for mammogram results. Notified pt that it is still pending review. Pt had no further questions or concerns at this time.   Is This A New Presentation, Or A Follow-Up?: Rash

## 2022-03-25 NOTE — TELEPHONE ENCOUNTER
"----- Message from Matt Rolon sent at 3/25/2022 10:33 AM CDT -----  Regarding: Results  Contact: Olamide  Consult/Advisory:       Name Of Caller: Olamide      Contact Preference?: [phone]       Does patient feel the need to be seen today? No      What is the nature of the call?: Pt would like to speak with nurse to get mammo results.       Additional Notes:  "Thank you for all that you do for our patients'"      "

## 2022-06-29 ENCOUNTER — TELEPHONE (OUTPATIENT)
Dept: HEMATOLOGY/ONCOLOGY | Facility: CLINIC | Age: 77
End: 2022-06-29
Payer: MEDICARE

## 2022-06-29 NOTE — TELEPHONE ENCOUNTER
Confirmed with patient that we have received the specific clearance form from the orthopedic clinic and we will fill out and fax back

## 2022-06-29 NOTE — TELEPHONE ENCOUNTER
----- Message from Margarita Dupree sent at 6/29/2022  8:24 AM CDT -----  Regarding: Questions and concerns  Contact: [phone] or [phone]  Patient Olamide is calling. Patient is having knee replacement surgery and need surgery clearance. Dr oMntiel in [place] is doing surgery. Office# [phone]..F# [phone] (Attn) Charlotte Streeter Please call patient at [phone] or [phone].. Patient would like to know when the letter has been faxed to the office..      Thank You

## 2023-01-05 ENCOUNTER — TELEPHONE (OUTPATIENT)
Dept: HEMATOLOGY/ONCOLOGY | Facility: CLINIC | Age: 78
End: 2023-01-05
Payer: MEDICARE

## 2023-01-05 NOTE — TELEPHONE ENCOUNTER
Spoke with Ms. Walters regarding her mammogram appointment.  requested all appointments to be scheduled on the same day. She verbalized agreement and understanding for labs, a clinic visit, and her mammogram to be done on 3/28/23.

## 2023-01-05 NOTE — TELEPHONE ENCOUNTER
----- Message from To Welsh sent at 1/5/2023  9:51 AM CST -----  Regarding: Pt Advice  Pt called stating that she would like to speak with someone in Dr. Lakhani's office. She stated that she called to schedule her annual with Dr. Lakhani on yesterday and was not scheduled for an Mammogram. Her chart show she have a Mammo scheduled for 3/4/24. She was wondering if that was an error      Contact Olamide [phone]

## 2023-01-27 ENCOUNTER — TELEPHONE (OUTPATIENT)
Dept: HEMATOLOGY/ONCOLOGY | Facility: CLINIC | Age: 78
End: 2023-01-27
Payer: MEDICARE

## 2023-01-27 NOTE — TELEPHONE ENCOUNTER
"----- Message from Brenna Gray sent at 1/27/2023  9:27 AM CST -----    ----- Message -----  From: Giovanni Navarro  Sent: 1/27/2023   9:22 AM CST  To: [facility] Bmt  Pool    Consult/Advisory:          Name Of Caller: Self      Contact Preference?:  [phone] (Mobile)      Provider Name: Kar      Does patient feel the need to be seen today? No      What is the nature of the call?: Requesting clarification if 3/28 appt needs to be r/s or not          Additional Notes:  "Thank you for all that you do for our patients"        "

## 2023-02-14 ENCOUNTER — OFFICE VISIT (OUTPATIENT)
Dept: OBSTETRICS AND GYNECOLOGY | Facility: CLINIC | Age: 78
End: 2023-02-14
Payer: MEDICARE

## 2023-02-14 VITALS — DIASTOLIC BLOOD PRESSURE: 68 MMHG | SYSTOLIC BLOOD PRESSURE: 112 MMHG

## 2023-02-14 DIAGNOSIS — Z01.419 ENCOUNTER FOR ANNUAL ROUTINE GYNECOLOGICAL EXAMINATION: Primary | ICD-10-CM

## 2023-02-14 PROCEDURE — G0101 PR CA SCREEN;PELVIC/BREAST EXAM: ICD-10-PCS | Mod: S$GLB,,, | Performed by: OBSTETRICS & GYNECOLOGY

## 2023-02-14 PROCEDURE — G0101 CA SCREEN;PELVIC/BREAST EXAM: HCPCS | Mod: S$GLB,,, | Performed by: OBSTETRICS & GYNECOLOGY

## 2023-02-14 NOTE — PROGRESS NOTES
Medicare Breast and Pelvic    HPI:  Olamide Walters is a 77 y.o. female  presents for a well woman exam.  LMP: No LMP recorded. Patient is postmenopausal..  No issues, problems, or complaints.    Past Medical History:   Diagnosis Date    Arthritis     Hyperlipidemia     Osteopenia     Unspecified vitamin D deficiency      Past Surgical History:   Procedure Laterality Date    APPENDECTOMY      CHOLECYSTECTOMY      SPLENECTOMY, TOTAL  1982    TOTAL KNEE REPLACEMENT USING COMPUTER NAVIGATION Left      Social History     Socioeconomic History    Marital status:    Tobacco Use    Smoking status: Never    Smokeless tobacco: Never   Substance and Sexual Activity    Alcohol use: Yes     Alcohol/week: 2.0 standard drinks     Types: 2 Glasses of wine per week    Drug use: No    Sexual activity: Not Currently     Family History   Problem Relation Age of Onset    Breast cancer Maternal Cousin     Colon cancer Neg Hx     Ovarian cancer Neg Hx     Uterine cancer Neg Hx      OB History          0    Para   0    Term   0       0    AB   0    Living   0         SAB   0    IAB   0    Ectopic   0    Multiple   0    Live Births   0                 /68 (BP Location: Right arm, Patient Position: Sitting)     ROS:  GENERAL: Denies weight gain or weight loss. Feeling well overall.   SKIN: Denies rash or lesions.   HEAD: Denies head injury or headache.   NODES: Denies enlarged lymph nodes.   CHEST: Denies chest pain or shortness of breath.   CARDIOVASCULAR: Denies palpitations or left sided chest pain.   ABDOMEN: No abdominal pain, constipation, diarrhea, nausea, vomiting or rectal bleeding.   URINARY: No frequency, dysuria, hematuria, or burning on urination.  REPRODUCTIVE: See HPI.   BREASTS: The patient performs breast self-examination and denies pain, lumps, or nipple discharge.   HEMATOLOGIC: No easy bruisability or excessive bleeding.   MUSCULOSKELETAL: Denies joint pain or swelling.    NEUROLOGIC: Denies syncope or weakness.   PSYCHIATRIC: Denies depression, anxiety or mood swings.    PHYSICAL EXAM:    APPEARANCE: Well nourished, well developed, in no acute distress.  AFFECT: WNL, alert and oriented x 3  SKIN: No acne or hirsutism  NECK: Neck symmetric without masses or thyromegaly  NODES: No inguinal, cervical, axillary, or femoral lymph node enlargement  CHEST: Good respiratory effect  ABDOMEN: Soft.  No tenderness or masses.  No hepatosplenomegaly.  No hernias.  BREASTS: Symmetrical, no skin changes or visible lesions.  No palpable masses, nipple discharge bilaterally.  PELVIC: Normal external genitalia without lesions.  Normal hair distribution.  Adequate perineal body, normal urethral meatus.  Vagina moist and well rugated without lesions or discharge.  Cervix pink, without lesions, discharge or tenderness.  No significant cystocele or rectocele.  Bimanual exam shows uterus to be normal size, regular, mobile and nontender.  Adnexa without masses or tenderness.    RECTAL: Rectovaginal exam confirms above with normal sphincter tone, no masses.  EXTREMITIES: No edema.      ICD-10-CM ICD-9-CM    1. Encounter for annual routine gynecological examination  Z01.419 V72.31         Negative gyn exam  Mammogram ordered  Return 1 year or as needed

## 2023-02-22 ENCOUNTER — TELEPHONE (OUTPATIENT)
Dept: HEMATOLOGY/ONCOLOGY | Facility: CLINIC | Age: 78
End: 2023-02-22
Payer: MEDICARE

## 2023-02-22 NOTE — TELEPHONE ENCOUNTER
----- Message from Joseph Quiroz sent at 2/22/2023  9:36 AM CST -----  Regarding: Consult/Advisory      Name Of Caller: Self.      Contact Preference?: [phone]      Nature of Call? Pt would like to know if she is due for her pneumonia shot? Pt would like a callback. If shot is due pt would like to go to [facility] back at home.

## 2023-02-22 NOTE — TELEPHONE ENCOUNTER
Adults with immunocompromising conditions - The ACIP recommends up to two doses of PPSV23 spaced five years apart before age 65 years followed by one dose of PPSV23 after age 65 and after five years has passed since the previous PPSV23 dose [11]. The authors suggest revaccination with PPSV23 every 5 to 10 years.    Both PCV13 and PPSV23 ? Adults who have already received both PCV13 and PPSV23 require no further vaccination for the primary vaccination series.    Above from up to date. Reviewed with Dr. Lakhani. Advised patient that ppsv23 is needed in January 2025.

## 2023-03-13 ENCOUNTER — TELEPHONE (OUTPATIENT)
Dept: OBSTETRICS AND GYNECOLOGY | Facility: CLINIC | Age: 78
End: 2023-03-13
Payer: MEDICARE

## 2023-03-13 NOTE — TELEPHONE ENCOUNTER
----- Message from Amaya Odell sent at 3/13/2023 10:01 AM CDT -----  Contact: PT  Type:  Test Results    Who Called:  PT  Name of Test (Lab/Mammo/Etc):  LAB PAP  Date of Test:  2/14/23  Ordering Provider:  ALEJANDRA  Where the test was performed:  [facility]  Best Call Back Number:  [phone]  Additional Information:

## 2023-03-27 ENCOUNTER — TELEPHONE (OUTPATIENT)
Dept: HEMATOLOGY/ONCOLOGY | Facility: CLINIC | Age: 78
End: 2023-03-27
Payer: MEDICARE

## 2023-03-27 NOTE — TELEPHONE ENCOUNTER
----- Message from Jelly Crocker sent at 3/27/2023  7:14 AM CDT -----  Regarding: reschedule Appt  Contact: pt  Type:  Pt Advice    Who Called: Pt  Would the patient rather a call back or a response via [facility]? JOHNNY  Best Call Back Number: [phone]  Additional Information: pt would like to Reschedule Appt , possibly all on the same day as mammogram,.. Please call to advise , thank you

## 2023-04-27 ENCOUNTER — HOSPITAL ENCOUNTER (OUTPATIENT)
Dept: RADIOLOGY | Facility: HOSPITAL | Age: 78
Discharge: HOME | End: 2023-04-27
Attending: INTERNAL MEDICINE
Payer: MEDICARE

## 2023-04-27 ENCOUNTER — OFFICE VISIT (OUTPATIENT)
Dept: HEMATOLOGY/ONCOLOGY | Facility: CLINIC | Age: 78
End: 2023-04-27
Payer: MEDICARE

## 2023-04-27 VITALS
TEMPERATURE: 98 F | DIASTOLIC BLOOD PRESSURE: 60 MMHG | HEIGHT: 66 IN | BODY MASS INDEX: 25.22 KG/M2 | WEIGHT: 156.94 LBS | SYSTOLIC BLOOD PRESSURE: 131 MMHG | HEART RATE: 67 BPM | RESPIRATION RATE: 16 BRPM

## 2023-04-27 DIAGNOSIS — Z12.31 ENCOUNTER FOR SCREENING MAMMOGRAM FOR MALIGNANT NEOPLASM OF BREAST: ICD-10-CM

## 2023-04-27 DIAGNOSIS — D58.0 HEREDITARY SPHEROCYTOSIS: Primary | ICD-10-CM

## 2023-04-27 DIAGNOSIS — M17.9 OSTEOARTHRITIS OF KNEE, UNSPECIFIED LATERALITY, UNSPECIFIED OSTEOARTHRITIS TYPE: ICD-10-CM

## 2023-04-27 DIAGNOSIS — Z12.31 BREAST CANCER SCREENING BY MAMMOGRAM: ICD-10-CM

## 2023-04-27 PROCEDURE — 77063 BREAST TOMOSYNTHESIS BI: CPT | Mod: 26,,, | Performed by: RADIOLOGY

## 2023-04-27 PROCEDURE — 99999 PR PBB SHADOW E&M-EST. PATIENT-LVL IV: CPT | Mod: PBBFAC,,, | Performed by: PHYSICIAN ASSISTANT

## 2023-04-27 PROCEDURE — 99214 OFFICE O/P EST MOD 30 MIN: CPT | Mod: S$PBB,,, | Performed by: PHYSICIAN ASSISTANT

## 2023-04-27 PROCEDURE — 99214 OFFICE O/P EST MOD 30 MIN: CPT | Mod: PBBFAC | Performed by: PHYSICIAN ASSISTANT

## 2023-04-27 PROCEDURE — 77067 SCR MAMMO BI INCL CAD: CPT | Mod: 26,,, | Performed by: RADIOLOGY

## 2023-04-27 PROCEDURE — 77067 SCR MAMMO BI INCL CAD: CPT | Mod: TC

## 2023-04-27 PROCEDURE — 99214 PR OFFICE/OUTPT VISIT, EST, LEVL IV, 30-39 MIN: ICD-10-PCS | Mod: S$PBB,,, | Performed by: PHYSICIAN ASSISTANT

## 2023-04-27 PROCEDURE — 77063 MAMMO DIGITAL SCREENING BILAT WITH TOMO: ICD-10-PCS | Mod: 26,,, | Performed by: RADIOLOGY

## 2023-04-27 PROCEDURE — 99999 PR PBB SHADOW E&M-EST. PATIENT-LVL IV: ICD-10-PCS | Mod: PBBFAC,,, | Performed by: PHYSICIAN ASSISTANT

## 2023-04-27 PROCEDURE — 77067 MAMMO DIGITAL SCREENING BILAT WITH TOMO: ICD-10-PCS | Mod: 26,,, | Performed by: RADIOLOGY

## 2023-04-27 RX ORDER — CLOTRIMAZOLE AND BETAMETHASONE DIPROPIONATE 10; .64 MG/G; MG/G
CREAM TOPICAL 2 TIMES DAILY
COMMUNITY
Start: 2022-12-20

## 2023-04-27 RX ORDER — DICLOFENAC SODIUM 50 MG/1
1 TABLET, DELAYED RELEASE ORAL 2 TIMES DAILY
COMMUNITY
Start: 2022-11-08

## 2023-04-27 RX ORDER — CYCLOSPORINE 0.5 MG/ML
EMULSION OPHTHALMIC
COMMUNITY
Start: 2022-08-24

## 2023-04-27 RX ORDER — DIPHENHYDRAMINE HCL 25 MG
25 CAPSULE ORAL
COMMUNITY
Start: 2021-10-18

## 2023-04-27 RX ORDER — ASPIRIN 325 MG
TABLET ORAL
COMMUNITY
Start: 2022-08-11

## 2023-04-27 RX ORDER — NITROFURANTOIN 25; 75 MG/1; MG/1
100 CAPSULE ORAL 2 TIMES DAILY
COMMUNITY
Start: 2023-04-06

## 2023-04-27 RX ORDER — NALOXONE HYDROCHLORIDE 4 MG/.1ML
0.1 SPRAY NASAL
COMMUNITY
Start: 2022-08-11

## 2023-04-27 RX ORDER — HYDROXYZINE HYDROCHLORIDE 25 MG/1
25 TABLET, FILM COATED ORAL NIGHTLY PRN
COMMUNITY
Start: 2023-01-26

## 2023-04-27 RX ORDER — AMPICILLIN 500 MG/1
CAPSULE ORAL
COMMUNITY
Start: 2023-03-09

## 2023-04-27 RX ORDER — HYDROCODONE BITARTRATE AND ACETAMINOPHEN 10; 325 MG/1; MG/1
TABLET ORAL
COMMUNITY
Start: 2022-08-11

## 2023-09-11 ENCOUNTER — TELEPHONE (OUTPATIENT)
Dept: HEMATOLOGY/ONCOLOGY | Facility: CLINIC | Age: 78
End: 2023-09-11
Payer: MEDICARE

## 2023-09-11 NOTE — TELEPHONE ENCOUNTER
"----- Message from Anusha Gaitan sent at 9/11/2023 11:41 AM CDT -----  Regarding: Pt advice  Contact: Pt     Pt requesting call back in regards to next pneumonia spot.   Please call and adv @       Confirmed contact below:   Contact Name: Olamide Walters  Phone Number: [phone]               Additional Notes:  "Thank you for all that you do for our patients"                                         "

## 2023-09-11 NOTE — TELEPHONE ENCOUNTER
"----- Message from Anusha Gaitan sent at 9/11/2023 11:41 AM CDT -----  Regarding: Pt advice  Contact: Pt     Pt requesting call back in regards to next pneumonia spot.   Please call and adv @       Confirmed contact below:   Contact Name: Olamide Walters  Phone Number: [phone]               Additional Notes:  "Thank you for all that you do for our patients"                                         "

## 2023-09-12 ENCOUNTER — TELEPHONE (OUTPATIENT)
Dept: HEMATOLOGY/ONCOLOGY | Facility: CLINIC | Age: 78
End: 2023-09-12
Payer: MEDICARE

## 2023-09-12 NOTE — TELEPHONE ENCOUNTER
"----- Message from Anusha Gaitan sent at 9/12/2023  9:48 AM CDT -----  Regarding: Pt advice  Contact: Pt     Pt requesting call back in regards to pneumonia shot PCV13 that she received. Pt would like more information.   Please call and adv @       Confirmed contact below:   Contact Name: Olamide Schrader Maamy  Phone Number: [phone]               Additional Notes:  "Thank you for all that you do for our patients"                                           "

## 2023-09-13 ENCOUNTER — TELEPHONE (OUTPATIENT)
Dept: HEMATOLOGY/ONCOLOGY | Facility: CLINIC | Age: 78
End: 2023-09-13
Payer: MEDICARE

## 2023-09-13 NOTE — TELEPHONE ENCOUNTER
----- Message from Felicity Man sent at 9/13/2023  9:51 AM CDT -----  Regarding: Consult Advisory  Contact: Pt  Pt is requesting a callback regarding vaccine for Pneumonia(Prevnar 20).  Pt would like to know if she had it already. Pleas adv pt         Confirmed contact below:   Contact Name:Olamide Schrader Romeo  Phone Number: [phone]

## 2023-11-10 ENCOUNTER — PATIENT MESSAGE (OUTPATIENT)
Dept: HEMATOLOGY/ONCOLOGY | Facility: CLINIC | Age: 78
End: 2023-11-10
Payer: MEDICARE

## 2023-11-10 ENCOUNTER — TELEPHONE (OUTPATIENT)
Dept: HEMATOLOGY/ONCOLOGY | Facility: CLINIC | Age: 78
End: 2023-11-10
Payer: MEDICARE

## 2023-11-10 NOTE — TELEPHONE ENCOUNTER
----- Message from Ximena Herbert sent at 11/10/2023 10:04 AM CST -----  Pt would like to be called back regarding  vaccine       Pt can be reached at  [phone]

## 2023-11-10 NOTE — TELEPHONE ENCOUNTER
----- Message from Ximena Herbert sent at 11/10/2023 10:04 AM CST -----  Pt would like to be called back regarding  vaccine       Pt can be reached at  [phone]

## 2023-11-10 NOTE — TELEPHONE ENCOUNTER
Discussed vaccinations with pt. Sending vaccine info to pt to show pharmacist. Advised pt to also reach out to PCP regarding vaccinations. Pt voiced understanding.

## 2024-01-21 DIAGNOSIS — Z12.31 ENCOUNTER FOR SCREENING MAMMOGRAM FOR MALIGNANT NEOPLASM OF BREAST: ICD-10-CM

## 2024-01-21 DIAGNOSIS — Z12.39 ENCOUNTER FOR SCREENING FOR MALIGNANT NEOPLASM OF BREAST, UNSPECIFIED SCREENING MODALITY: Primary | ICD-10-CM

## 2024-02-16 ENCOUNTER — OFFICE VISIT (OUTPATIENT)
Dept: OBSTETRICS AND GYNECOLOGY | Facility: CLINIC | Age: 79
End: 2024-02-16
Payer: MEDICARE

## 2024-02-16 VITALS
WEIGHT: 154 LBS | SYSTOLIC BLOOD PRESSURE: 122 MMHG | BODY MASS INDEX: 24.75 KG/M2 | DIASTOLIC BLOOD PRESSURE: 76 MMHG | HEIGHT: 66 IN

## 2024-02-16 DIAGNOSIS — Z01.419 ENCOUNTER FOR ANNUAL ROUTINE GYNECOLOGICAL EXAMINATION: Primary | ICD-10-CM

## 2024-02-16 DIAGNOSIS — Z12.31 BREAST CANCER SCREENING BY MAMMOGRAM: ICD-10-CM

## 2024-02-16 DIAGNOSIS — Z12.4 PAP SMEAR FOR CERVICAL CANCER SCREENING: ICD-10-CM

## 2024-02-16 PROCEDURE — 88175 CYTOPATH C/V AUTO FLUID REDO: CPT | Performed by: OBSTETRICS & GYNECOLOGY

## 2024-02-16 PROCEDURE — G0101 CA SCREEN;PELVIC/BREAST EXAM: HCPCS | Mod: S$GLB,,, | Performed by: OBSTETRICS & GYNECOLOGY

## 2024-02-16 RX ORDER — ALENDRONATE SODIUM 70 MG/1
70 TABLET ORAL WEEKLY
COMMUNITY
Start: 2024-01-18

## 2024-02-16 NOTE — PROGRESS NOTES
"Medicare Breast and Pelvic    HPI:  Olamide Walters is a 78 y.o. female  presents for a well woman exam.  LMP: No LMP recorded. Patient is postmenopausal..  No issues, problems, or complaints.    Past Medical History:   Diagnosis Date    Arthritis     Hyperlipidemia     Osteopenia     Unspecified vitamin D deficiency      Past Surgical History:   Procedure Laterality Date    APPENDECTOMY      CHOLECYSTECTOMY      SPLENECTOMY, TOTAL  1982    TOTAL KNEE REPLACEMENT USING COMPUTER NAVIGATION Left     TOTAL KNEE REPLACEMENT USING COMPUTER NAVIGATION Right      Social History     Socioeconomic History    Marital status:    Tobacco Use    Smoking status: Never    Smokeless tobacco: Never   Substance and Sexual Activity    Alcohol use: Yes     Alcohol/week: 2.0 standard drinks of alcohol     Types: 2 Glasses of wine per week    Drug use: No    Sexual activity: Not Currently     Family History   Problem Relation Age of Onset    Breast cancer Maternal Cousin     Colon cancer Neg Hx     Ovarian cancer Neg Hx     Uterine cancer Neg Hx      OB History          0    Para   0    Term   0       0    AB   0    Living   0         SAB   0    IAB   0    Ectopic   0    Multiple   0    Live Births   0                 /76 (BP Location: Right arm, Patient Position: Sitting)   Ht 5' 6" (1.676 m)   Wt 69.9 kg (154 lb)   BMI 24.86 kg/m²     ROS:  GENERAL: Denies weight gain or weight loss. Feeling well overall.   SKIN: Denies rash or lesions.   HEAD: Denies head injury or headache.   NODES: Denies enlarged lymph nodes.   CHEST: Denies chest pain or shortness of breath.   CARDIOVASCULAR: Denies palpitations or left sided chest pain.   ABDOMEN: No abdominal pain, constipation, diarrhea, nausea, vomiting or rectal bleeding.   URINARY: No frequency, dysuria, hematuria, or burning on urination.  REPRODUCTIVE: See HPI.   BREASTS: The patient performs breast self-examination and denies pain, lumps, or " nipple discharge.   HEMATOLOGIC: No easy bruisability or excessive bleeding.   MUSCULOSKELETAL: Denies joint pain or swelling.   NEUROLOGIC: Denies syncope or weakness.   PSYCHIATRIC: Denies depression, anxiety or mood swings.    PHYSICAL EXAM:    APPEARANCE: Well nourished, well developed, in no acute distress.  AFFECT: WNL, alert and oriented x 3  SKIN: No acne or hirsutism  NECK: Neck symmetric without masses or thyromegaly  NODES: No inguinal, cervical, axillary, or femoral lymph node enlargement  CHEST: Good respiratory effect  ABDOMEN: Soft.  No tenderness or masses.  No hepatosplenomegaly.  No hernias.  BREASTS: Symmetrical, no skin changes or visible lesions.  No palpable masses, nipple discharge bilaterally.  PELVIC:    V/v severe atrophy but no lesions or discharge, no prolapse  Cervix:  Blind Pap  Uterus small  Adnexa nonpalpable and nontender, no pelvic masses appreciated      ICD-10-CM ICD-9-CM    1. Encounter for annual routine gynecological examination  Z01.419 V72.31       2. Pap smear for cervical cancer screening  Z12.4 V76.2 Liquid-Based Pap Smear, Screening      3. Breast cancer screening by mammogram  Z12.31 V76.12         Mammogram is scheduled

## 2024-02-23 LAB
FINAL PATHOLOGIC DIAGNOSIS: NORMAL
Lab: NORMAL

## 2024-05-02 ENCOUNTER — HOSPITAL ENCOUNTER (OUTPATIENT)
Dept: RADIOLOGY | Facility: HOSPITAL | Age: 79
Discharge: HOME | End: 2024-05-02
Attending: PHYSICIAN ASSISTANT
Payer: MEDICARE

## 2024-05-02 ENCOUNTER — OFFICE VISIT (OUTPATIENT)
Dept: HEMATOLOGY/ONCOLOGY | Facility: CLINIC | Age: 79
End: 2024-05-02
Payer: MEDICARE

## 2024-05-02 VITALS
WEIGHT: 158.75 LBS | RESPIRATION RATE: 16 BRPM | HEIGHT: 66 IN | DIASTOLIC BLOOD PRESSURE: 64 MMHG | OXYGEN SATURATION: 94 % | BODY MASS INDEX: 25.51 KG/M2 | TEMPERATURE: 98 F | HEART RATE: 79 BPM | SYSTOLIC BLOOD PRESSURE: 107 MMHG

## 2024-05-02 DIAGNOSIS — Z12.31 BREAST CANCER SCREENING BY MAMMOGRAM: ICD-10-CM

## 2024-05-02 DIAGNOSIS — D58.0 HEREDITARY SPHEROCYTOSIS: Primary | ICD-10-CM

## 2024-05-02 PROCEDURE — 99999 PR PBB SHADOW E&M-EST. PATIENT-LVL III: CPT | Mod: PBBFAC,,, | Performed by: INTERNAL MEDICINE

## 2024-05-02 PROCEDURE — 99214 OFFICE O/P EST MOD 30 MIN: CPT | Mod: S$PBB,,, | Performed by: INTERNAL MEDICINE

## 2024-05-02 PROCEDURE — 77063 BREAST TOMOSYNTHESIS BI: CPT | Mod: 26,,, | Performed by: RADIOLOGY

## 2024-05-02 PROCEDURE — 77067 SCR MAMMO BI INCL CAD: CPT | Mod: 26,,, | Performed by: RADIOLOGY

## 2024-05-02 PROCEDURE — 99213 OFFICE O/P EST LOW 20 MIN: CPT | Mod: PBBFAC | Performed by: INTERNAL MEDICINE

## 2024-05-02 PROCEDURE — 77067 SCR MAMMO BI INCL CAD: CPT | Mod: TC

## 2024-05-02 NOTE — PROGRESS NOTES
Route Chart for Scheduling         Subjective:       Patient ID: Olamide Walters is a 78 y.o. female.    Chief Complaint: Hereditary spherocytosis f/u    Referring Physician: Jere Alex MD    Mrs. Walters is a 73-year-old woman who in 1982 had a splenectomy for hereditary spherocytosis.  She had previously had problems with anemia.  At the time of the splenectomy, she had compensated hemolysis and cholelithiasis.  This was long before the era of laparoscopic surgery and since she was to have an open cholecystectomy, an elective splenectomy was performed at the same time.  A liver biopsy was also obtained because of mild elevation of the transaminases and that biopsy was normal.    She has been followed since that time without evidence of recurrent anemia and with no aplastic crises.  She has had a pneumococcal 13 and pneumococcal 23 vaccines along with meningococcal vaccines.  She will be due for a pneumococcal   23 vaccination and a repeat meningococcal vaccination in late 2019 or early 2020.      Return visit 5/2/2024  No acute interval events  CBC remains normal  Mammogram results pending, had earlier today  Completed prevnar 20 vaccine since last visit    HPI  Review of Systems   Constitutional: Negative.    HENT: Negative.     Eyes: Negative.    Respiratory: Negative.     Cardiovascular: Negative.    Gastrointestinal: Negative.    Endocrine: Negative.    Genitourinary: Negative.    Musculoskeletal: Negative.    Skin: Negative.    Allergic/Immunologic: Negative for environmental allergies, food allergies and immunocompromised state.   Neurological: Negative.    Hematological:  Negative for adenopathy. Does not bruise/bleed easily.   Psychiatric/Behavioral: Negative.           Objective:      Physical Exam  Vitals and nursing note reviewed.   Constitutional:       Appearance: She is well-developed.   HENT:      Head: Normocephalic and atraumatic.   Eyes:      General: No scleral icterus.      Conjunctiva/sclera: Conjunctivae normal.   Cardiovascular:      Rate and Rhythm: Normal rate.   Pulmonary:      Effort: Pulmonary effort is normal. No respiratory distress.   Abdominal:      General: There is no distension.      Palpations: Abdomen is soft.      Tenderness: There is no abdominal tenderness.   Musculoskeletal:         General: Normal range of motion.      Cervical back: Normal range of motion and neck supple.   Skin:     General: Skin is warm and dry.   Neurological:      Mental Status: She is alert and oriented to person, place, and time.      Cranial Nerves: No cranial nerve deficit.   Psychiatric:         Behavior: Behavior normal.         Assessment:       No diagnosis found.        Plan:       Hereditary Spherocytosis  Splenectomy is a cure for hereditary spherocytosis. CBC remains normal.    Routine Cancer Screening  Mammogram today, results pending, repeat in 1 year unless otherwise indicated    Follow up:  Return visit in 1 year with CBC and mammogram        BMT Chart Routing      Follow up with physician 1 year.   Follow up with [facility]    Provider visit type    Infusion scheduling note    Injection scheduling note    Labs CBC and CMP   Scheduling:  Preferred lab:  Lab interval:     Imaging   Mammogram with 1 year return visit   Pharmacy appointment    Other referrals                  A total of 20 minutes was spent in pre-visit chart review, personal interpretation of labs and imaging, and medication review. Total visit time 30 minutes, >50 % counseling.

## 2025-01-28 ENCOUNTER — TELEPHONE (OUTPATIENT)
Dept: HEMATOLOGY/ONCOLOGY | Facility: CLINIC | Age: 80
End: 2025-01-28
Payer: MEDICARE

## 2025-01-28 ENCOUNTER — TELEPHONE (OUTPATIENT)
Dept: OBSTETRICS AND GYNECOLOGY | Facility: CLINIC | Age: 80
End: 2025-01-28
Payer: MEDICARE

## 2025-01-28 NOTE — TELEPHONE ENCOUNTER
----- Message from Pedro sent at 1/28/2025  8:45 AM CST -----  Regarding: Scheduling Request  Contact: [phone]  Scheduling Request           Appt Type:  follow up with Dr Lakhani in 1 year with CBC, CMP and mammogram     Date/Time Preference: May     Treating Provider: Dr. Lakhani     Caller Name: Olamide Walters     Contact Preference:  [phone]     Comments/notes:

## 2025-01-28 NOTE — TELEPHONE ENCOUNTER
LVM appointment has been scheduled for May 28th at 1:10. Instructed if this is not a good time please call [phone] or reach out via the portal.

## 2025-02-10 ENCOUNTER — TELEPHONE (OUTPATIENT)
Dept: HEMATOLOGY/ONCOLOGY | Facility: CLINIC | Age: 80
End: 2025-02-10
Payer: MEDICARE

## 2025-02-10 NOTE — TELEPHONE ENCOUNTER
Spoke with Ms. Walters regarding her request for an appointment at 10 am on 5/5/25 with . Pt was advised that the appointment schedule is not open yet for the month of May.  asked if she needs the meningococcal vaccine. She stated that she has already gotten the Prevnar 20. Pt stated that she is unsure if the Prevnar 20 took place of the Menococcal vaccine. Pt has hereditary spherocytosis. I advised that I sent a message to  and would provide an update soon. Pt verbalized agreement and understanding.

## 2025-02-10 NOTE — TELEPHONE ENCOUNTER
Spoke with Ms. Walters and advised that she does not need the Meningococcal vaccine per Dr.Laura Lakhani because she had two doses once in 2014 and 2020. Pt verbalized agreement and understanding.

## 2025-02-10 NOTE — TELEPHONE ENCOUNTER
----- Message from Zoe sent at 2/10/2025  9:03 AM CST -----  Regarding: Appt  Type: Appt     Who Called:Olamide Walters      Would the patient rather a call back or a response via [facility]? Call back    Best Call Back Number:  [phone]    Additional Information:Patient called to f/u with office about scheduling office visit on 5/5.

## 2025-04-28 ENCOUNTER — TELEPHONE (OUTPATIENT)
Dept: HEMATOLOGY/ONCOLOGY | Facility: CLINIC | Age: 80
End: 2025-04-28
Payer: MEDICARE

## 2025-04-28 NOTE — TELEPHONE ENCOUNTER
----- Message from Estela sent at 4/28/2025 10:07 AM CDT -----  Regarding: Scheduling Request  Name Of Caller:   Slade Preference:    [phone] of call:    Pt would like to schedule Ep appt w/ Dr. Lakhani after their labs and mammogram on 05/05.

## 2025-04-29 ENCOUNTER — TELEPHONE (OUTPATIENT)
Dept: HEMATOLOGY/ONCOLOGY | Facility: CLINIC | Age: 80
End: 2025-04-29
Payer: MEDICARE

## 2025-05-02 ENCOUNTER — TELEPHONE (OUTPATIENT)
Dept: HEMATOLOGY/ONCOLOGY | Facility: CLINIC | Age: 80
End: 2025-05-02
Payer: MEDICARE

## 2025-05-02 NOTE — TELEPHONE ENCOUNTER
----- Message from Pedro sent at 2025  4:21 PM CDT -----  Regarding: Reschedule Existing Appointment  Contact: [phone]  Reschedule Existing Appointment Current appt date: 2025 Type of appt : Lab and Mammo Physician: Dr. Lakhani Reason for rescheduling:  Caller: Olamide Contact Preference:  [phone] has a question about a vaccine  ----- Message -----  From: Pedro Payton  Sent: 2025   4:23 PM CDT  To: [facility] Bmt  Pool  Subject: Reschedule Existing Appointment                  Reschedule Existing Appointment Current appt date: 2025 Type of appt : Lab and Mammo Physician: Dr. Lakhani Reason for rescheduling:  Caller: Olamide Contact Preference:  [phone]

## 2025-05-05 ENCOUNTER — HOSPITAL ENCOUNTER (OUTPATIENT)
Dept: RADIOLOGY | Facility: HOSPITAL | Age: 80
Discharge: HOME | End: 2025-05-05
Attending: INTERNAL MEDICINE
Payer: MEDICARE

## 2025-05-05 DIAGNOSIS — Z12.31 BREAST CANCER SCREENING BY MAMMOGRAM: ICD-10-CM

## 2025-05-05 PROCEDURE — 77063 BREAST TOMOSYNTHESIS BI: CPT | Mod: 26,,, | Performed by: RADIOLOGY

## 2025-05-05 PROCEDURE — 77067 SCR MAMMO BI INCL CAD: CPT | Mod: 26,,, | Performed by: RADIOLOGY

## 2025-05-05 PROCEDURE — 77063 BREAST TOMOSYNTHESIS BI: CPT | Mod: TC

## 2025-05-06 ENCOUNTER — TELEPHONE (OUTPATIENT)
Dept: HEMATOLOGY/ONCOLOGY | Facility: CLINIC | Age: 80
End: 2025-05-06
Payer: MEDICARE

## 2025-05-06 DIAGNOSIS — D58.0 HEREDITARY SPHEROCYTOSIS: ICD-10-CM

## 2025-05-06 DIAGNOSIS — Z90.81 POST-SPLENECTOMY: Primary | ICD-10-CM

## 2025-05-06 NOTE — TELEPHONE ENCOUNTER
----- Message from "[employer] sent at 5/6/2025 10:59 AM CDT -----  Regarding: Consult/Advisory  Contact: Olamide Walters   Consult/Advisory Name Of Caller:Olamide Walters   Contact Preference:[phone] (home)   Nature of call:Patient is calling to check status of vaccine orders to her pharmacy and she there now. Requesting a call back [id number] - [place], [place] - [id number] [place] [address]Wnfvg: [phone] Fax: [phone]

## 2025-05-06 NOTE — TELEPHONE ENCOUNTER
Returned call to patient. Advised that Dr. Lakhani ordered ID consult to ensure the correct vaccine was ordered. Once complete and ordered, I will contact patient to verify.    Patient stated that she is leaving the country for 5 month as of 5/21

## 2025-05-07 ENCOUNTER — E-CONSULT (OUTPATIENT)
Dept: INFECTIOUS DISEASES | Facility: CLINIC | Age: 80
End: 2025-05-07
Payer: MEDICARE

## 2025-05-07 DIAGNOSIS — Z90.81 POST-SPLENECTOMY: Primary | ICD-10-CM

## 2025-05-07 PROCEDURE — 99451 NTRPROF PH1/NTRNET/EHR 5/>: CPT | Mod: S$PBB,,, | Performed by: STUDENT IN AN ORGANIZED HEALTH CARE EDUCATION/TRAINING PROGRAM

## 2025-05-07 NOTE — CONSULTS
[facility] - Infectious Diseases  Response for E-Consult     Patient Name: Olamide Walters  MRN: [id number]  Primary Care Provider: Jere Alex Jr., MD (Inactive)   Requesting Provider: Beatrice Lakhani MD  E-Consult to Infectious Disease  Consult performed by: Desiree Mejia MD  Consult ordered by: Beatrice Lakhani MD          Recommendation: The quadrivalent (menveo/menactra) can be administered every 5 years.     Additional future steps to consider: administer quadrivalent conjugate meningitis vaccine.      Total time of Consultation: 5 minute    I did not speak to the requesting provider verbally about this.     *This eConsult is based on the clinical data available to me and is furnished without benefit of a physical examination. The eConsult will need to be interpreted in light of any clinical issues or changes in patient status not available to me at the time of filing this eConsults. Significant changes in patient condition or level of acuity should result in immediate formal consultation and reevaluation. Please alert me if you have further questions.    Thank you for this eConsult referral.     Desiree Mejia MD  [facility] - Infectious Diseases

## 2025-05-12 ENCOUNTER — TELEPHONE (OUTPATIENT)
Dept: HEMATOLOGY/ONCOLOGY | Facility: CLINIC | Age: 80
End: 2025-05-12
Payer: MEDICARE

## 2025-05-12 DIAGNOSIS — Z90.81 POST-SPLENECTOMY: ICD-10-CM

## 2025-05-12 DIAGNOSIS — D58.0 HEREDITARY SPHEROCYTOSIS: Primary | ICD-10-CM

## 2025-05-12 RX ORDER — MENINGOCOCCAL (GROUPS A, C, Y AND W-135) OLIGOSACCHARIDE DIPHTHERIA CRM197 CONJUGATE VACCINE 10-5/0.5ML
0.5 KIT INTRAMUSCULAR ONCE
Qty: 0.5 ML | Refills: 0 | Status: SHIPPED | OUTPATIENT
Start: 2025-05-12 | End: 2025-05-12

## 2025-05-12 NOTE — TELEPHONE ENCOUNTER
Patient called to report pharmacy reported that Menatcra was discontinued in 2022 is not available and asked if Menveo could be sent to pharmacy for patient as that one is available.    Advised I would follow up with Dr. Lakhani and then message patient once done,

## 2025-05-12 NOTE — TELEPHONE ENCOUNTER
----- Message from Zoe sent at 5/12/2025  8:10 AM CDT -----  Regarding: Rx f/u  Type: Rx [facility]/Rashmi Called:Olamide Abadould the patient rather a call back or a response [facility]? [facility]: [phone] Information:Patient called to f/u on vaccine being sent to pharmacy in [place].

## 2025-05-12 NOTE — PROGRESS NOTES
Prior authorization approved  Payer: [employer] - Medicare Case ID: [id number]    [phone]    [phone]  Note from payer: Request Reference Number: [id number]. MENVEO INJ is approved through 12/31/2025. Your patient may now fill this prescription and it will be covered.  Approval Details    Authorized from May 12, 2025 to December 31, 2025  Electronic appeal: Not supported

## 2025-05-12 NOTE — TELEPHONE ENCOUNTER
Left voicemail for patient verifying that prescription for meningococcal vaccine has been sent to [facility] as previously requested

## 2025-05-28 ENCOUNTER — PATIENT MESSAGE (OUTPATIENT)
Dept: HEMATOLOGY/ONCOLOGY | Facility: CLINIC | Age: 80
End: 2025-05-28
Payer: MEDICARE